# Patient Record
Sex: FEMALE | Race: WHITE | NOT HISPANIC OR LATINO | Employment: OTHER | URBAN - METROPOLITAN AREA
[De-identification: names, ages, dates, MRNs, and addresses within clinical notes are randomized per-mention and may not be internally consistent; named-entity substitution may affect disease eponyms.]

---

## 2023-10-08 PROBLEM — D22.70 MELANOCYTIC NEVI OF LOWER EXTREMITY OR HIP: Status: ACTIVE | Noted: 2021-07-07

## 2023-10-08 PROBLEM — D22.5 MELANOCYTIC NEVI OF TRUNK: Status: ACTIVE | Noted: 2021-07-07

## 2023-10-08 PROBLEM — D22.4 MELANOCYTIC NEVI OF SCALP AND NECK: Status: ACTIVE | Noted: 2021-07-07

## 2023-10-08 PROBLEM — L73.8 OTHER SPECIFIED FOLLICULAR DISORDERS: Status: ACTIVE | Noted: 2021-07-07

## 2023-10-08 PROBLEM — D23.72 OTHER BENIGN NEOPLASM OF SKIN OF LEFT LOWER LIMB, INCLUDING HIP: Status: ACTIVE | Noted: 2021-07-07

## 2023-10-08 PROBLEM — D18.01 HEMANGIOMA OF SKIN AND SUBCUTANEOUS TISSUE: Status: ACTIVE | Noted: 2021-07-07

## 2023-10-08 PROBLEM — L85.8 OTHER SPECIFIED EPIDERMAL THICKENING: Status: ACTIVE | Noted: 2021-07-07

## 2023-10-08 PROBLEM — D22.60 MELANOCYTIC NEVI OF UNSPECIFIED UPPER LIMB, INCLUDING SHOULDER: Status: ACTIVE | Noted: 2021-07-07

## 2023-10-08 PROBLEM — L82.1 OTHER SEBORRHEIC KERATOSIS: Status: ACTIVE | Noted: 2021-07-07

## 2023-10-09 ENCOUNTER — OFFICE VISIT (OUTPATIENT)
Dept: DERMATOLOGY | Facility: CLINIC | Age: 53
End: 2023-10-09
Payer: COMMERCIAL

## 2023-10-09 DIAGNOSIS — L82.1 SEBORRHEIC KERATOSIS: ICD-10-CM

## 2023-10-09 DIAGNOSIS — D22.9 MULTIPLE BENIGN NEVI: ICD-10-CM

## 2023-10-09 DIAGNOSIS — D48.5 NEOPLASM OF UNCERTAIN BEHAVIOR OF SKIN: Primary | ICD-10-CM

## 2023-10-09 DIAGNOSIS — L81.4 LENTIGO: ICD-10-CM

## 2023-10-09 DIAGNOSIS — D18.01 HEMANGIOMA OF SKIN: ICD-10-CM

## 2023-10-09 PROCEDURE — 99213 OFFICE O/P EST LOW 20 MIN: CPT | Performed by: DERMATOLOGY

## 2023-10-09 RX ORDER — PANTOPRAZOLE SODIUM 40 MG/1
40 TABLET, DELAYED RELEASE ORAL
COMMUNITY
Start: 2022-04-08

## 2023-10-09 NOTE — PROGRESS NOTES
Subjective     Carol Rosen is a 53 y.o. female who presents for the following: Suspicious Skin Lesion (Left shoulder, back, face).     Review of Systems:  No other skin or systemic complaints other than what is documented elsewhere in the note.    The following portions of the chart were reviewed this encounter and updated as appropriate:   Allergies  Meds  Problems  Med Hx  Surg Hx  Fam Hx         Skin Cancer History  No skin cancer on file.      Specialty Problems          Dermatology Problems    Hemangioma of skin and subcutaneous tissue    Melanocytic nevi of lower extremity or hip    Melanocytic nevi of scalp and neck    Melanocytic nevi of trunk    Melanocytic nevi of unspecified upper limb, including shoulder    Other benign neoplasm of skin of left lower limb, including hip    Other seborrheic keratosis    Other specified epidermal thickening    Other specified follicular disorders        Objective   Well appearing patient in no apparent distress; mood and affect are within normal limits.    A focused skin examination was performed. The patient declines a full skin exam. Portions of the face, chest, forearms, L shoulder, back and anterior lower legs were examined. All findings within normal limits unless otherwise noted below.    Assessment/Plan   1. Neoplasm of uncertain behavior of skin  Right Lower Back  Irregularly shaped, irregularly pigmented macule with irregularly placed globules    Discussed atypical appearance to this nevus. Recommend removal and analysis with pathology. Patient declines biopsy at this time and wishes to self monitor the nevus.  -Encouraged patient to return for biopsy if patient is willing or if patient notices any changes in the lesion.    2. Multiple benign nevi  Brown and tan macules and papules with reassuring findings on dermoscopy    -These lesions have benign, reassuring patterns on dermoscopy  -Recommend continued self observation, and to contact the office if any  changes in nevi are noticed    3. Lentigo  Tan macules    -Benign appearing on exam  -Reassurance, recommend observation    4. Seborrheic keratosis  Stuck on, waxy macule(s)/papule(s)/plaque(s) with comedo-like openings and milia like cysts    -Discussed the nature of the diagnosis  -Reassurance, recommend continued observation  (lesion of patient's concern on the L shoulder)    5. Hemangioma of skin  Cherry red papules    -Discussed the nature of the diagnosis  -Reassurance, recommend continued observation        Follow up as needed. Patient declines FSE.  Discussed if there are any changes or development of concerning symptoms (lesion/skin condition is changing, bleeding, enlarging, or worsening) the patient is to contact my office. The patient verbalizes understanding.    Rossi Fisher MD  10/9/2023